# Patient Record
Sex: MALE | Race: WHITE | URBAN - NONMETROPOLITAN AREA
[De-identification: names, ages, dates, MRNs, and addresses within clinical notes are randomized per-mention and may not be internally consistent; named-entity substitution may affect disease eponyms.]

---
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
## 2019-04-16 ENCOUNTER — OFFICE VISIT (OUTPATIENT)
Dept: FAMILY MEDICINE CLINIC | Age: 19
End: 2019-04-16
Payer: COMMERCIAL

## 2019-04-16 VITALS
OXYGEN SATURATION: 98 % | DIASTOLIC BLOOD PRESSURE: 80 MMHG | WEIGHT: 130 LBS | SYSTOLIC BLOOD PRESSURE: 110 MMHG | HEART RATE: 71 BPM

## 2019-04-16 DIAGNOSIS — F32.A DEPRESSION, UNSPECIFIED DEPRESSION TYPE: Primary | ICD-10-CM

## 2019-04-16 PROCEDURE — G0444 DEPRESSION SCREEN ANNUAL: HCPCS | Performed by: FAMILY MEDICINE

## 2019-04-16 PROCEDURE — 99214 OFFICE O/P EST MOD 30 MIN: CPT | Performed by: FAMILY MEDICINE

## 2019-04-16 RX ORDER — ESCITALOPRAM OXALATE 10 MG/1
10 TABLET ORAL DAILY
Qty: 30 TABLET | Refills: 3 | Status: SHIPPED | OUTPATIENT
Start: 2019-04-16 | End: 2019-08-31 | Stop reason: SDUPTHER

## 2019-04-16 RX ORDER — CLINDAMYCIN HYDROCHLORIDE 150 MG/1
CAPSULE ORAL
COMMUNITY
Start: 2019-01-17 | End: 2019-07-17

## 2019-04-16 RX ORDER — IBUPROFEN 800 MG/1
TABLET ORAL
COMMUNITY
Start: 2019-01-17 | End: 2021-04-29 | Stop reason: ALTCHOICE

## 2019-04-16 ASSESSMENT — PATIENT HEALTH QUESTIONNAIRE - PHQ9
10. IF YOU CHECKED OFF ANY PROBLEMS, HOW DIFFICULT HAVE THESE PROBLEMS MADE IT FOR YOU TO DO YOUR WORK, TAKE CARE OF THINGS AT HOME, OR GET ALONG WITH OTHER PEOPLE: 1
3. TROUBLE FALLING OR STAYING ASLEEP: 3
6. FEELING BAD ABOUT YOURSELF - OR THAT YOU ARE A FAILURE OR HAVE LET YOURSELF OR YOUR FAMILY DOWN: 1
9. THOUGHTS THAT YOU WOULD BE BETTER OFF DEAD, OR OF HURTING YOURSELF: 0
7. TROUBLE CONCENTRATING ON THINGS, SUCH AS READING THE NEWSPAPER OR WATCHING TELEVISION: 2
8. MOVING OR SPEAKING SO SLOWLY THAT OTHER PEOPLE COULD HAVE NOTICED. OR THE OPPOSITE, BEING SO FIGETY OR RESTLESS THAT YOU HAVE BEEN MOVING AROUND A LOT MORE THAN USUAL: 2
SUM OF ALL RESPONSES TO PHQ9 QUESTIONS 1 & 2: 4
SUM OF ALL RESPONSES TO PHQ QUESTIONS 1-9: 16
1. LITTLE INTEREST OR PLEASURE IN DOING THINGS: 3
2. FEELING DOWN, DEPRESSED OR HOPELESS: 1
SUM OF ALL RESPONSES TO PHQ QUESTIONS 1-9: 16
4. FEELING TIRED OR HAVING LITTLE ENERGY: 3
5. POOR APPETITE OR OVEREATING: 1

## 2019-04-17 ASSESSMENT — ENCOUNTER SYMPTOMS
GASTROINTESTINAL NEGATIVE: 1
RESPIRATORY NEGATIVE: 1

## 2019-04-17 NOTE — PROGRESS NOTES
encouraged him to come to the office. There is depression in her side of the family and she actually had a brother who committed suicide about 3 years ago. She is being treated for anxiety. The patient has not sought any counseling services at the [facility] though they are offered    BP Readings from Last 3 Encounters:   04/16/19 110/80   03/16/18 120/80   08/14/17 114/70 (39 %, Z = -0.28 /  61 %, Z = 0.28)*     *BP percentiles are based on the August 2017 AAP Clinical Practice Guideline for boys            (goal 120/80)    Past Medical History:   Diagnosis Date    Lactose intolerance     Milk intolerance     Milk sensitivity    Pectus excavatum     Small stature 05/10/10      No past surgical history on file. Family History   Problem Relation Age of Onset    Other Mother         hay fever    Diabetes Maternal Grandmother     Heart Disease Paternal Grandfather      Social History     Tobacco Use    Smoking status: Never Smoker    Smokeless tobacco: Never Used   Substance Use Topics    Alcohol use: No        Current Outpatient Medications   Medication Sig Dispense Refill    escitalopram (LEXAPRO) 10 MG tablet Take 1 tablet by mouth daily 30 tablet 3    clindamycin (CLEOCIN) 150 MG capsule       ibuprofen (ADVIL;MOTRIN) 800 MG tablet       salicylic acid-lactic acid 17 % external solution Apply daily 15 mL 2     No current facility-administered medications for this visit.       Allergies   Allergen Reactions    Augmentin [Amoxicillin-Pot Clavulanate]     Other      Milk intolerance    Penicillins Hives       Health Maintenance   Topic Date Due    Varicella Vaccine (2 of 2 - 2-dose childhood series) 11/16/2003    DTaP/Tdap/Td vaccine (6 - Tdap) 11/16/2010    HPV vaccine (1 - Male 3-dose series) 11/16/2014    HIV screen  11/16/2014    Flu vaccine (Season Ended) 09/01/2019    Meningococcal (ACWY) Vaccine  Aged Out    Pneumococcal 0-64 years Vaccine  Aged Out       No results found for: SHERRELL CREATININE, AST, ALT, TSH, HCT, LABA1C, MICROALBUR, PSA, GLUCOSE, MG, CALCIUM, [id number], VITD25, FERRITIN, TIBC, IRON No results found for: CHOL, TRIG, HDL, LDLCHOLESTEROL    Subjective:      Review of Systems   Constitutional: Positive for activity change, appetite change, fatigue and unexpected weight change. Negative for fever. HENT: Negative. Respiratory: Negative. Cardiovascular: Negative. Gastrointestinal: Negative. Genitourinary: Negative. Musculoskeletal: Negative. Neurological: Negative. Psychiatric/Behavioral: Positive for decreased concentration and dysphoric mood. Negative for agitation, behavioral problems, hallucinations, self-injury, sleep disturbance and suicidal ideas. The patient is not nervous/anxious and is not hyperactive. Objective:     /80   Pulse 71   Wt 130 lb (59 kg)   SpO2 98%     Physical Exam   Constitutional: He appears well-developed and well-nourished. HENT:   Mouth/Throat: Oropharynx is clear and moist.   Neck: Normal range of motion. No thyromegaly present. Cardiovascular: Normal rate, regular rhythm and normal heart sounds. No murmur heard. Pulmonary/Chest: Effort normal and breath sounds normal.   Lymphadenopathy:     He has no cervical adenopathy. Psychiatric: Thought content normal. His speech is delayed. He is slowed. Thought content is not delusional. He exhibits a depressed mood. He expresses no suicidal ideation. He expresses no suicidal plans and no homicidal plans. Assessment:      Diagnosis Orders   1. Depression, unspecified depression type  escitalopram (LEXAPRO) 10 MG tablet            POC Testing Results (If Applicable):  No results found for this visit on 04/16/19. Plan: We will initiate Lexapro. Reviewed side effects. Reviewed black box warning and the possibility of suicidal ideation. In which case he will discontinue the medication and seek attention either in this office or in the emergency room. With this discussion however both the patient and mother were agreeable to treatment. He will seek counseling at [facility] that is unavailable or insufficient and we will arrange for counseling in the community although the parents should check about EAP benefits. Recheck in 3 weeks sooner if any problems    Orders Given:  No orders of the defined types were placed in this encounter. Prescriptions:    Orders Placed This Encounter   Medications    escitalopram (LEXAPRO) 10 MG tablet     Sig: Take 1 tablet by mouth daily     Dispense:  30 tablet     Refill:  3        Return in about 3 weeks (around 5/7/2019). Electronically signed by Aaron Rothman MD on4/17/2019. **This report has been created using voice recognition software. It may contain minor errors which are inherent in voice recognition technology. **

## 2019-05-07 ENCOUNTER — OFFICE VISIT (OUTPATIENT)
Dept: FAMILY MEDICINE CLINIC | Age: 19
End: 2019-05-07
Payer: COMMERCIAL

## 2019-05-07 VITALS
DIASTOLIC BLOOD PRESSURE: 80 MMHG | SYSTOLIC BLOOD PRESSURE: 110 MMHG | HEART RATE: 69 BPM | WEIGHT: 128 LBS | OXYGEN SATURATION: 98 %

## 2019-05-07 DIAGNOSIS — F32.A DEPRESSION, UNSPECIFIED DEPRESSION TYPE: Primary | ICD-10-CM

## 2019-05-07 PROCEDURE — 99213 OFFICE O/P EST LOW 20 MIN: CPT | Performed by: FAMILY MEDICINE

## 2019-05-08 ASSESSMENT — ENCOUNTER SYMPTOMS
RESPIRATORY NEGATIVE: 1
GASTROINTESTINAL NEGATIVE: 1

## 2019-05-08 NOTE — PROGRESS NOTES
[address] [place] [address]  [address]  Dept: [phone]  DeptFax: [phone]    Iker Reddy is a21 y.o. male who presents today for his medical conditions/complaints as noted below. Iker Reddy is c/o of 1 Month Follow-Up (pt here to f/u on depression at last visit lexapro initiated and advised to seek counseling at [facility]. pt says he has noticed a difference but says his mother has commented on noticing a difference in pt. )      HPI:     HPI     Patient returns to the office in follow-up. Follow-up for depression and anxiety. Begun on Lexapro 10 mg at last visit. A 3 week follow-up. Patient states that is doing better. When asked what he means by that he states he feels less sad. He has not had any crying spells. Classes are going better. He did go out with some his friends to a movie. He just feels better. Says he doesn't feel so much stress. Though mother is not in attendance he says that she told him he thought is doing much better also. No problems with sleep. No problems with appetite. No problems with concentration. Denies feeling helpless or hopeless. He feels like he has more energy. He did not seek any counseling however    BP Readings from Last 3 Encounters:   05/07/19 110/80   04/16/19 110/80   03/16/18 120/80            (goal 120/80)    Past Medical History:   Diagnosis Date    Lactose intolerance     Milk intolerance     Milk sensitivity    Pectus excavatum     Small stature 05/10/10      No past surgical history on file.   Family History   Problem Relation Age of Onset    Other Mother         hay fever    Diabetes Maternal Grandmother     Heart Disease Paternal Grandfather      Social History     Tobacco Use    Smoking status: Never Smoker    Smokeless tobacco: Never Used   Substance Use Topics    Alcohol use: No        Current Outpatient Medications   Medication Sig Dispense Refill    clindamycin (CLEOCIN) 150 MG capsule       ibuprofen (ADVIL;MOTRIN) 800 MG tablet       escitalopram (LEXAPRO) 10 MG tablet Take 1 tablet by mouth daily 30 tablet 3    salicylic acid-lactic acid 17 % external solution Apply daily 15 mL 2     No current facility-administered medications for this visit. Allergies   Allergen Reactions    Augmentin [Amoxicillin-Pot Clavulanate]     Other      Milk intolerance    Penicillins Hives       Health Maintenance   Topic Date Due    Varicella Vaccine (2 of 2 - 2-dose childhood series) 11/16/2003    DTaP/Tdap/Td vaccine (6 - Tdap) 11/16/2010    HPV vaccine (1 - Male 3-dose series) 11/16/2014    HIV screen  11/16/2014    Flu vaccine (Season Ended) 09/01/2019    Meningococcal (ACWY) Vaccine  Aged Out    Pneumococcal 0-64 years Vaccine  Aged Out       No results found for: K, CREATININE, AST, ALT, TSH, HCT, LABA1C, MICROALBUR, PSA, GLUCOSE, MG, CALCIUM, [id number], VITD25, FERRITIN, TIBC, IRON No results found for: CHOL, TRIG, HDL, LDLCHOLESTEROL    Subjective:      Review of Systems   Constitutional: Positive for activity change ( improved). HENT: Negative. Respiratory: Negative. Cardiovascular: Negative. Gastrointestinal: Negative. Genitourinary: Negative. Musculoskeletal: Negative. Neurological: Negative. Psychiatric/Behavioral: Positive for dysphoric mood (improved). Negative for agitation, behavioral problems, decreased concentration, hallucinations, self-injury, sleep disturbance and suicidal ideas. The patient is not nervous/anxious and is not hyperactive. Objective:     /80   Pulse 69   Wt 128 lb (58.1 kg)   SpO2 98%     Physical Exam   Constitutional: He appears well-developed and well-nourished. HENT:   Mouth/Throat: Oropharynx is clear and moist.   Neck: Normal range of motion. No thyromegaly present. Cardiovascular: Normal rate, regular rhythm and normal heart sounds. No murmur heard.   Pulmonary/Chest: Effort normal and breath sounds normal.   Lymphadenopathy:     He has no cervical adenopathy. Psychiatric: His speech is normal and behavior is normal. Thought content normal. He does not exhibit a depressed mood. Assessment:      Diagnosis Orders   1. Depression, unspecified depression type              POC Testing Results (If Applicable):  No results found for this visit on 05/07/19. Plan:     Does seem to be doing better. He is encouraged to continue medication. Has not stopped the medication or run out of the medication. Furthermore I would think that he be on the medication for at least a year. He should be open to the idea that he may still need to have some counseling. Though he  found school to be stressful, this summer if he does not have a job or something to do it may find that a little bit difficult to deal with also. Return the office in 2 months sooner if any problems     Orders Given:  No orders of the defined types were placed in this encounter. Prescriptions:    No orders of the defined types were placed in this encounter. Return in about 2 months (around 7/7/2019). Electronically signed by Yenifer Rosa MD on5/8/2019. **This report has been created using voice recognition software. It may contain minor errors which are inherent in voice recognition technology. **

## 2019-07-17 ENCOUNTER — OFFICE VISIT (OUTPATIENT)
Dept: FAMILY MEDICINE CLINIC | Age: 19
End: 2019-07-17
Payer: COMMERCIAL

## 2019-07-17 VITALS
SYSTOLIC BLOOD PRESSURE: 106 MMHG | OXYGEN SATURATION: 98 % | DIASTOLIC BLOOD PRESSURE: 54 MMHG | WEIGHT: 129 LBS | HEART RATE: 68 BPM

## 2019-07-17 DIAGNOSIS — F32.A DEPRESSION, UNSPECIFIED DEPRESSION TYPE: Primary | ICD-10-CM

## 2019-07-17 PROCEDURE — 99213 OFFICE O/P EST LOW 20 MIN: CPT | Performed by: FAMILY MEDICINE

## 2019-07-17 ASSESSMENT — ENCOUNTER SYMPTOMS
GASTROINTESTINAL NEGATIVE: 1
RESPIRATORY NEGATIVE: 1

## 2019-07-17 NOTE — PROGRESS NOTES
depressed mood. Assessment:      Diagnosis Orders   1. Depression, unspecified depression type              POC Testing Results (If Applicable):  No results found for this visit on 07/17/19. Plan:   Continue current medications and recheck in 3 months. Return sooner if any problems. Patient is reminded that he is not to stop or run out of the medication. And that he should remain on the medication indefinitely at the present time. Orders Given:  No orders of the defined types were placed in this encounter. Prescriptions:    No orders of the defined types were placed in this encounter. No follow-ups on file. Electronically signed by Shelley Johnson MD on7/17/2019. **This report has been created using voice recognition software. It may contain minor errors which are inherent in voice recognition technology. **

## 2019-08-31 DIAGNOSIS — F32.A DEPRESSION, UNSPECIFIED DEPRESSION TYPE: ICD-10-CM

## 2019-09-03 RX ORDER — ESCITALOPRAM OXALATE 10 MG/1
TABLET ORAL
Qty: 30 TABLET | Refills: 0 | Status: SHIPPED | OUTPATIENT
Start: 2019-09-03 | End: 2019-10-13 | Stop reason: SDUPTHER

## 2019-10-13 DIAGNOSIS — F32.A DEPRESSION, UNSPECIFIED DEPRESSION TYPE: ICD-10-CM

## 2019-10-15 RX ORDER — ESCITALOPRAM OXALATE 10 MG/1
TABLET ORAL
Qty: 30 TABLET | Refills: 0 | Status: SHIPPED | OUTPATIENT
Start: 2019-10-15 | End: 2019-10-17 | Stop reason: SDUPTHER

## 2019-10-17 ENCOUNTER — OFFICE VISIT (OUTPATIENT)
Dept: FAMILY MEDICINE CLINIC | Age: 19
End: 2019-10-17
Payer: COMMERCIAL

## 2019-10-17 VITALS
WEIGHT: 127 LBS | SYSTOLIC BLOOD PRESSURE: 110 MMHG | HEART RATE: 60 BPM | OXYGEN SATURATION: 97 % | DIASTOLIC BLOOD PRESSURE: 68 MMHG

## 2019-10-17 DIAGNOSIS — F32.A DEPRESSION, UNSPECIFIED DEPRESSION TYPE: Primary | ICD-10-CM

## 2019-10-17 DIAGNOSIS — Z23 NEED FOR INFLUENZA VACCINATION: ICD-10-CM

## 2019-10-17 PROCEDURE — 90471 IMMUNIZATION ADMIN: CPT | Performed by: FAMILY MEDICINE

## 2019-10-17 PROCEDURE — 90686 IIV4 VACC NO PRSV 0.5 ML IM: CPT | Performed by: FAMILY MEDICINE

## 2019-10-17 PROCEDURE — 99213 OFFICE O/P EST LOW 20 MIN: CPT | Performed by: FAMILY MEDICINE

## 2019-10-17 RX ORDER — ESCITALOPRAM OXALATE 10 MG/1
10 TABLET ORAL DAILY
Qty: 90 TABLET | Refills: 1 | Status: SHIPPED | OUTPATIENT
Start: 2019-10-17 | End: 2020-04-16 | Stop reason: SDUPTHER

## 2019-10-17 ASSESSMENT — ENCOUNTER SYMPTOMS
RESPIRATORY NEGATIVE: 1
GASTROINTESTINAL NEGATIVE: 1

## 2020-04-16 RX ORDER — ESCITALOPRAM OXALATE 10 MG/1
10 TABLET ORAL DAILY
Qty: 30 TABLET | Refills: 0 | Status: SHIPPED | OUTPATIENT
Start: 2020-04-16 | End: 2020-04-16 | Stop reason: SDUPTHER

## 2020-04-16 RX ORDER — ESCITALOPRAM OXALATE 10 MG/1
10 TABLET ORAL DAILY
Qty: 90 TABLET | Refills: 0 | Status: SHIPPED | OUTPATIENT
Start: 2020-04-16 | End: 2020-04-22 | Stop reason: SDUPTHER

## 2020-04-22 ENCOUNTER — VIRTUAL VISIT (OUTPATIENT)
Dept: FAMILY MEDICINE CLINIC | Age: 20
End: 2020-04-22
Payer: COMMERCIAL

## 2020-04-22 PROCEDURE — 99213 OFFICE O/P EST LOW 20 MIN: CPT | Performed by: FAMILY MEDICINE

## 2020-04-22 RX ORDER — ESCITALOPRAM OXALATE 10 MG/1
10 TABLET ORAL DAILY
Qty: 90 TABLET | Refills: 1 | Status: SHIPPED | OUTPATIENT
Start: 2020-04-22 | End: 2021-04-29 | Stop reason: SDUPTHER

## 2020-04-22 ASSESSMENT — ENCOUNTER SYMPTOMS
RESPIRATORY NEGATIVE: 1
GASTROINTESTINAL NEGATIVE: 1

## 2020-04-22 NOTE — PROGRESS NOTES
without talking to this office. However I do not recommend that he try to stop at this time. Am unclear how long he has been depressed. Whether the experience in college exacerbated his symptoms. I suspect that is the case. I insisted he come in every 6 months. He does not really want to. I actually filled the medication without this visit being scheduled. So that he would not experience withdrawal symptoms. I went sure he has enough medication for the next 6 months. Once again he should return in 6 months. Sooner if any problems      No follow-ups on file. Reshma Hidalgo is a [address] y.o. male being evaluated by a Virtual Visit (video visit) encounter to address concerns as mentioned above. A caregiver was present when appropriate. Due to this being a TeleHealth encounter ([facility]), evaluation of the following organ systems was limited: Vitals/Constitutional/EENT/Resp/CV/GI//MS/Neuro/Skin/Heme-Lymph-Imm. Pursuant to the emergency declaration under the [address] [place] [address] and the [employer] Act, this Virtual Visit was conducted with patient's (and/or legal guardian's) consent, to reduce the patient's risk of exposure to COVID-19 and provide necessary medical care. The patient (and/or legal guardian) has also been advised to contact this office for worsening conditions or problems, and seek emergency medical treatment and/or call 911 if deemed necessary. Services were provided through a video synchronous discussion virtually to substitute for in-person clinic visit. Patient and provider were located at their individual homes. --Aure Sabillon MD on 4/22/2020 at 11:39 AM    An electronic signature was used to authenticate this note.

## 2021-04-28 NOTE — PROGRESS NOTES
[address] [address] [address]  Dept: [phone]  Dept Fax: [phone]    Chief Complaint   Patient presents with   Annelise Perez Doctor     former dr Titi Donato pt, would like to have thyroid checked due to family hx    Medication Refill     HPI:   Patient presents today to establish care. He previously followed with Dr. Regina Fleming. 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 He is requesting to have his thyroid checked due to family history of hyperthyroidism. He is requesting a refill on the Escitalopram.  He cannot remember why he takes 800 mg ibuprofen. Mental Health Problem   The primary symptoms include depression and anxiety. he has the following symptoms: none. Onset of symptoms was approximately a few years ago. Symptoms have been stable since starting medication. he denies current suicidal and homicidal ideation. Family history significant for anxiety. Risk factors: positive family history in  mother. Current treatment includes Lexapro. he complains of the following medication side effects: none. Appetite: normal  Sleep disturbance: No  Fatigue: No  Loss of pleasure: Yes  Impulsive behavior: No  Memory: recent and remote memory intact  Attention/Concentration: intact  Suicide Assessment: no suicidal ideation      BP Readings from Last 3 Encounters:   04/29/21 130/74   10/17/19 110/68   07/17/19 (!) 106/54        Pulse Readings from Last 3 Encounters:   04/29/21 77   10/17/19 60   07/17/19 68        Wt Readings from Last 3 Encounters:   04/29/21 129 lb 3.2 oz (58.6 kg)   10/17/19 127 lb (57.6 kg) (8 %, Z= -1.38)*   07/17/19 129 lb (58.5 kg) (11 %, Z= -1.24)*     * Growth percentiles are based on CDC (Boys, 2-20 Years) data.         Current Outpatient Medications   Medication Sig Dispense Refill    escitalopram (LEXAPRO) 10 MG tablet Take 1 tablet by mouth daily 90 tablet 2     No current facility-administered medications for this visit. Past Medical History:   Diagnosis Date    Depression     Lactose intolerance     Milk intolerance     Milk sensitivity    Pectus excavatum     Small stature 05/10/10     No past surgical history on file.   Family History   Problem Relation Age of Onset    Other Mother         hay fever    Diabetes Maternal Grandmother     Heart Disease Paternal Grandfather      Social History     Socioeconomic History    Marital status: Single     Spouse name: Not on file    Number of children: Not on file    Years of education: Not on file    Highest education level: Not on file   Occupational History    Not on file   Social Needs    Financial resource strain: Not hard at all   [employer] insecurity     Worry: Never true     Inability: Never true   [employer] needs     Medical: No     Non-medical: No   Tobacco Use    Smoking status: Never Smoker    Smokeless tobacco: Never Used   Substance and Sexual Activity    Alcohol use: No    Drug use: No    Sexual activity: Not on file   Lifestyle    Physical activity     Days per week: Not on file     Minutes per session: Not on file    Stress: Not on file   Relationships    Social connections     Talks on phone: Not on file     Gets together: Not on file     Attends Congregation service: Not on file     Active member of club or organization: Not on file     Attends meetings of clubs or organizations: Not on file     Relationship status: Not on file    Intimate partner violence     Fear of current or ex partner: Not on file     Emotionally abused: Not on file     Physically abused: Not on file     Forced sexual activity: Not on file   Other Topics Concern    Not on file   Social History Narrative    Not on file     Allergies   Allergen Reactions    Augmentin [Amoxicillin-Pot Clavulanate]     Other      Milk intolerance    Penicillins Hives       Patient Active Problem List   Diagnosis    Pectus excavatum Habits: With regard to his health habits, he eats 3 meals and 1 snacks per day. He does not exercise regularly, states he stay pretty active with work. He sometimes takes fiber gummies. He wears seatbelts while riding a car. He does not text or talk on the phone while driving. He performs all of her ADL's without problem. He is independent, he cooks,drives, bathes, and gets dressed without assistance. He is not . He has 0 children. He does work. He works 40 hours a week. He is happy with his life. He rates his stress level a 2 in a scale 1-10. Health Maintenance   Topic Date Due    Hepatitis C screen  Never done    Varicella vaccine (2 of 2 - 2-dose childhood series) 11/16/2003    HPV vaccine (1 - Male 2-dose series) Never done    HIV screen  Never done    COVID-19 Vaccine (1) Never done    Flu vaccine (Season Ended) 09/01/2021    DTaP/Tdap/Td vaccine (7 - Td) 08/31/2022    Hepatitis B vaccine  Completed    Hib vaccine  Completed    Meningococcal (ACWY) vaccine  Completed    Hepatitis A vaccine  Aged Out    Pneumococcal 0-64 years Vaccine  Aged Out       Subjective:     Review of Systems   Constitutional: Negative for chills, fatigue and fever. HENT: Negative. Eyes: Negative. Respiratory: Negative for cough, shortness of breath and wheezing. Cardiovascular: Negative for chest pain, palpitations and leg swelling. Gastrointestinal: Positive for constipation (occasional, sometimes takes fiber gummies). Negative for abdominal pain and diarrhea. Endocrine: Negative for cold intolerance, heat intolerance, polydipsia, polyphagia and polyuria. Genitourinary: Negative. Negative for difficulty urinating. Musculoskeletal: Negative for arthralgias, myalgias and neck pain. Skin: Negative. Allergic/Immunologic: Negative for environmental allergies and food allergies. Neurological: Negative for dizziness, weakness and headaches.    Psychiatric/Behavioral: Negative for agitation, decreased concentration, dysphoric mood, self-injury, sleep disturbance and suicidal ideas. The patient is not nervous/anxious. Objective:     Vitals:    04/29/21 0814   BP: 130/74   Pulse: 77   SpO2: 98%   Weight: 129 lb 3.2 oz (58.6 kg)   Height: 5' 6\" (1.676 m)        Estimated body mass index is 20.85 kg/m² as calculated from the following:    Height as of this encounter: 5' 6\" (1.676 m). Weight as of this encounter: 129 lb 3.2 oz (58.6 kg). Physical Exam  Constitutional:       Appearance: Normal appearance. He is well-developed and well-groomed. HENT:      Head: Normocephalic. Eyes:      Conjunctiva/sclera: Conjunctivae normal.      Pupils: Pupils are equal, round, and reactive to light. Neck:      Musculoskeletal: Neck supple. Thyroid: No thyromegaly. Cardiovascular:      Rate and Rhythm: Normal rate and regular rhythm. Heart sounds: Normal heart sounds. Pulmonary:      Effort: Pulmonary effort is normal.      Breath sounds: Normal breath sounds. No wheezing. Abdominal:      General: Bowel sounds are normal.      Palpations: Abdomen is soft. Tenderness: There is no abdominal tenderness. Musculoskeletal:         General: Deformity (chest depression) present. Right lower leg: No edema. Left lower leg: No edema. Lymphadenopathy:      Cervical: No cervical adenopathy. Skin:     Capillary Refill: Capillary refill takes less than 2 seconds. Neurological:      Mental Status: He is alert and oriented to person, place, and time. Gait: Gait normal.   Psychiatric:         Attention and Perception: Attention normal.         Mood and Affect: Mood is anxious. Behavior: Behavior is cooperative.          PHQ Scores 4/29/2021 4/16/2019 7/21/2017   PHQ2 Score 0 4 0   PHQ9 Score 0 16 0     Interpretation of Total Score Depression Severity: 1-4 = Minimal depression, 5-9 = Mild depression, 10-14 = Moderate depression, 15-19 = Moderately severe depression, 20-27 = Severe depression     Assessment:     1. Encounter to establish care    2. Encounter for wellness examination in adult    3. Mixed anxiety and depressive disorder    4. Pectus excavatum        Plan:     Orders Placed This Encounter   Medications    escitalopram (LEXAPRO) 10 MG tablet     Sig: Take 1 tablet by mouth daily     Dispense:  90 tablet     Refill:  2     Please consider 90 day supplies to promote better adherence       Orders Placed This Encounter   Procedures    TSH without Reflex     Standing Status:   Future     Standing Expiration Date:   4/29/2022    CBC Auto Differential     Standing Status:   Future     Standing Expiration Date:   6/28/2021    Basic Metabolic Panel     Standing Status:   Future     Standing Expiration Date:   4/29/2022     Encouraged healthy diet and routine exercise. Instructed to continue current medications. All patient questions answered. Pt voiced understanding. Health Maintenance reviewed. Patient agreed with treatment plan. Follow up as directed. Return if symptoms worsen or fail to improve. This note was generated completely or in part utilizing Dragon dictation speech recognition software. Occasionally, words are mistranscribed and despite editing, the text may contain inaccuracies due to incorrect word recognition. If further clarification is needed please contact the office at [phone].     Electronically signed by CHAITANYA Taylor CNP on 4/29/2021 at 3:07 PM.

## 2021-04-29 ENCOUNTER — OFFICE VISIT (OUTPATIENT)
Dept: FAMILY MEDICINE CLINIC | Age: 21
End: 2021-04-29
Payer: COMMERCIAL

## 2021-04-29 VITALS
SYSTOLIC BLOOD PRESSURE: 130 MMHG | HEART RATE: 77 BPM | OXYGEN SATURATION: 98 % | HEIGHT: 66 IN | DIASTOLIC BLOOD PRESSURE: 74 MMHG | BODY MASS INDEX: 20.76 KG/M2 | WEIGHT: 129.2 LBS

## 2021-04-29 DIAGNOSIS — Z76.89 ENCOUNTER TO ESTABLISH CARE: Primary | ICD-10-CM

## 2021-04-29 DIAGNOSIS — Z00.00 ENCOUNTER FOR WELLNESS EXAMINATION IN ADULT: ICD-10-CM

## 2021-04-29 DIAGNOSIS — Q67.6 PECTUS EXCAVATUM: ICD-10-CM

## 2021-04-29 DIAGNOSIS — F41.8 MIXED ANXIETY AND DEPRESSIVE DISORDER: ICD-10-CM

## 2021-04-29 PROBLEM — F32.A DEPRESSION: Status: RESOLVED | Noted: 2019-10-17 | Resolved: 2021-04-29

## 2021-04-29 PROCEDURE — 99214 OFFICE O/P EST MOD 30 MIN: CPT | Performed by: NURSE PRACTITIONER

## 2021-04-29 RX ORDER — ESCITALOPRAM OXALATE 10 MG/1
10 TABLET ORAL DAILY
Qty: 90 TABLET | Refills: 2 | Status: SHIPPED | OUTPATIENT
Start: 2021-04-29 | End: 2022-08-08 | Stop reason: SDUPTHER

## 2021-04-29 SDOH — ECONOMIC STABILITY: TRANSPORTATION INSECURITY
IN THE PAST 12 MONTHS, HAS THE LACK OF TRANSPORTATION KEPT YOU FROM MEDICAL APPOINTMENTS OR FROM GETTING MEDICATIONS?: NO

## 2021-04-29 ASSESSMENT — ENCOUNTER SYMPTOMS
WHEEZING: 0
DIARRHEA: 0
ABDOMINAL PAIN: 0
SHORTNESS OF BREATH: 0
CONSTIPATION: 1
COUGH: 0
EYES NEGATIVE: 1

## 2021-04-29 ASSESSMENT — PATIENT HEALTH QUESTIONNAIRE - PHQ9
SUM OF ALL RESPONSES TO PHQ QUESTIONS 1-9: 0
1. LITTLE INTEREST OR PLEASURE IN DOING THINGS: 0
2. FEELING DOWN, DEPRESSED OR HOPELESS: 0
SUM OF ALL RESPONSES TO PHQ QUESTIONS 1-9: 0

## 2022-07-14 DIAGNOSIS — F41.8 MIXED ANXIETY AND DEPRESSIVE DISORDER: ICD-10-CM

## 2022-07-14 RX ORDER — ESCITALOPRAM OXALATE 10 MG/1
TABLET ORAL
Qty: 90 TABLET | Refills: 0 | OUTPATIENT
Start: 2022-07-14

## 2022-07-14 NOTE — TELEPHONE ENCOUNTER
Madyson Gates is calling to request a refill on the following medication(s):  Requested Prescriptions     Pending Prescriptions Disp Refills    escitalopram (LEXAPRO) 10 MG tablet [Pharmacy Med Name: Escitalopram Oxalate 10 MG Oral Tablet] 90 tablet 0     Sig: Take 1 tablet by mouth once daily       Last Visit Date (If Applicable):  2/82/0200    Next Visit Date:    Visit date not found

## 2022-08-06 NOTE — PROGRESS NOTES
[address] [address] [address]  Dept: [phone]  Dept Fax: [phone]    History and Physical  Patient:  Maranda Costello  YOB: 2000  Date of Service:  2022    TELEHEALTH EVALUATION -- Audio/Visual (During IPNED-07 public health emergency)    HPI:     Maranda Costello (:  2000) has requested an audio/video evaluation for the following concern(s):    Chief Complaint   Patient presents with    Anxiety     Pt states he still gets anxious along with trouble sleeping. Appetite is good. Continues to take the Lexapro 10 mg daily. He ran out 1 week ago and has noticed a difference. He would also like to discuss increasing the dose. Mental Health Problem   The primary symptoms include depression and anxiety. Onset of symptoms was approximately a few years ago. Symptoms have been gradually worsening. he denies current suicidal and homicidal ideation. Family history significant for anxiety. Risk factors: positive family history in  mother. Current treatment includes Lexapro. he complains of the following medication side effects: none. Appetite: normal  Sleep disturbance: yes  Fatigue: No  Loss of pleasure: Yes  Impulsive behavior: No  Memory: recent and remote memory intact  Attention/Concentration: intact  Suicide Assessment: no suicidal ideation    BP Readings from Last 3 Encounters:   21 130/74   10/17/19 110/68   19 (!) 106/54        Pulse Readings from Last 3 Encounters:   21 77   10/17/19 60   19 68        Wt Readings from Last 3 Encounters:   21 129 lb 3.2 oz (58.6 kg)   10/17/19 127 lb (57.6 kg) (8 %, Z= -1.38)*   19 129 lb (58.5 kg) (11 %, Z= -1.24)*     * Growth percentiles are based on CDC (Boys, 2-20 Years) data.         Allergies   Allergen Reactions    Augmentin [Amoxicillin-Pot Clavulanate]     Other      Milk intolerance    Penicillins Hives        Past Medical History:   Diagnosis Date    Depression     Lactose intolerance     Milk intolerance     Milk sensitivity    Pectus excavatum     Small stature 05/10/10        No past surgical history on file. Social History     Tobacco Use    Smoking status: Never    Smokeless tobacco: Never   Substance Use Topics    Alcohol use: No    Drug use: No       Prior to Visit Medications    Medication Sig Taking? Authorizing Provider   escitalopram (LEXAPRO) 20 MG tablet Take 0.5 tablet by mouth daily for 1 week, then 1 tablet daily. Yes Breanna Larsen, APRN - CNP       Health Maintenance   Topic Date Due    COVID-19 Vaccine (1) Never done    Varicella vaccine (2 of 2 - 2-dose childhood series) 11/16/2003    HPV vaccine (1 - Male 2-dose series) Never done    HIV screen  Never done    Hepatitis C screen  Never done    DTaP/Tdap/Td vaccine (7 - Td or Tdap) 08/31/2022    Flu vaccine (1) 09/01/2022    Depression Monitoring  08/08/2023    Hepatitis B vaccine  Completed    Hib vaccine  Completed    Meningococcal (ACWY) vaccine  Completed    Hepatitis A vaccine  Aged Out    Pneumococcal 0-64 years Vaccine  Aged Out        REVIEW OF SYMPTOMS:     Review of Systems   Constitutional:  Positive for fatigue (with taking the medication). Negative for appetite change, chills and fever. HENT: Negative. Respiratory:  Negative for cough, shortness of breath and wheezing. Cardiovascular:  Negative for chest pain and palpitations. Gastrointestinal:  Negative for abdominal pain, constipation, diarrhea and nausea. Genitourinary: Negative. Musculoskeletal:  Negative for arthralgias and myalgias. Allergic/Immunologic: Negative for environmental allergies and food allergies. Neurological:  Negative for dizziness, light-headedness and headaches. Psychiatric/Behavioral:  Positive for agitation, dysphoric mood and sleep disturbance (difficulty falling alsleep). Negative for self-injury and suicidal ideas. The patient is nervous/anxious. No flowsheet data found. PHYSICAL EXAM:     [ INSTRUCTIONS:  \"[x]\" Indicates a positive item  \"[]\" Indicates a negative item  -- DELETE ALL ITEMS NOT EXAMINED]    Constitutional:   [x] Appears well-developed and well-nourished [x] No apparent distress    [] Abnormal-     Mental status:  [x] Alert and awake  [x] Oriented to person/place/time [x]Able to follow commands      Eyes:  EOM    []  Normal  [] Abnormal-  Sclera  [x]  Normal  [] Abnormal -         Discharge [x]  None visible  [] Abnormal -    HENT:   [x] Normocephalic, atraumatic. [] Abnormal   [x] Mouth/Throat: Mucous membranes are moist.     External Ears:  [x] Normal  [] Abnormal-     Neck:  [x] No visualized mass     Pulmonary/Chest:   [x] Respiratory effort normal.  [x] No visualized signs of difficulty breathing or respiratory distress  [] Abnormal-      Musculoskeletal:    [] Normal gait with no signs of ataxia. [] Normal range of motion of neck  [] Abnormal-     Neurological:      [x] No Facial Asymmetry (Cranial nerve 7 motor function) (limited exam to video visit)          [] No gaze palsy        [] Abnormal-         Skin:        [x] No significant exanthematous lesions or discoloration noted on facial skin         [] Abnormal-            Psychiatric:       [x] Normal Affect [x] No Hallucinations        [] Abnormal-     Other pertinent observable physical exam findings: none. PHQ Scores 8/8/2022 4/29/2021 4/16/2019 7/21/2017   PHQ2 Score 1 0 4 0   PHQ9 Score 5 0 16 0     Interpretation of Total Score Depression Severity: 1-4 = Minimal depression, 5-9 = Mild depression, 10-14 = Moderate depression, 15-19 = Moderately severe depression, 20-27 = Severe depression     PLAN:     1. Mixed anxiety and depressive disorder  -     escitalopram (LEXAPRO) 20 MG tablet; Take 0.5 tablet by mouth daily for 1 week, then 1 tablet daily. , Disp-30 tablet, R-2Please consider 90 day supplies to promote better adherenceNormal     Increase escitalopram to 20 mg, recommend taking before bedtime. Return if symptoms worsen or fail to improve. Becki Peña is a 24 y.o. male being evaluated by a Virtual Visit (video visit) encounter to address concerns as mentioned above. A caregiver was present when appropriate. Due to this being a TeleHealth encounter (During [facility] public health emergency), evaluation of the following organ systems was limited: Vitals/Constitutional/EENT/Resp/CV/GI//MS/Neuro/Skin/Heme-Lymph-Imm. Pursuant to the emergency declaration under the [address] [place] [address], [address] [place] [address] authority and the [employer] Act, this Virtual Visit was conducted with patient's (and/or legal guardian's) consent, to reduce the patient's risk of exposure to COVID-19 and provide necessary medical care. The patient (and/or legal guardian) has also been advised to contact this office for worsening conditions or problems, and seek emergency medical treatment and/or call 911 if deemed necessary. Services were provided through a video synchronous discussion virtually to substitute for in-person clinic visit. Patient and provider were located at their individual homes. Electronically signed by CHAITANYA Asencio CNP on 8/15/2022 at 10:05 PM.     An electronic signature was used to authenticate this note.

## 2022-08-08 ENCOUNTER — TELEMEDICINE (OUTPATIENT)
Dept: FAMILY MEDICINE CLINIC | Age: 22
End: 2022-08-08
Payer: COMMERCIAL

## 2022-08-08 DIAGNOSIS — F41.8 MIXED ANXIETY AND DEPRESSIVE DISORDER: ICD-10-CM

## 2022-08-08 PROCEDURE — 99213 OFFICE O/P EST LOW 20 MIN: CPT | Performed by: NURSE PRACTITIONER

## 2022-08-08 RX ORDER — ESCITALOPRAM OXALATE 20 MG/1
TABLET ORAL
Qty: 30 TABLET | Refills: 2 | Status: SHIPPED | OUTPATIENT
Start: 2022-08-08

## 2022-08-08 ASSESSMENT — PATIENT HEALTH QUESTIONNAIRE - PHQ9
10. IF YOU CHECKED OFF ANY PROBLEMS, HOW DIFFICULT HAVE THESE PROBLEMS MADE IT FOR YOU TO DO YOUR WORK, TAKE CARE OF THINGS AT HOME, OR GET ALONG WITH OTHER PEOPLE: 1
SUM OF ALL RESPONSES TO PHQ9 QUESTIONS 1 & 2: 1
SUM OF ALL RESPONSES TO PHQ QUESTIONS 1-9: 5
8. MOVING OR SPEAKING SO SLOWLY THAT OTHER PEOPLE COULD HAVE NOTICED. OR THE OPPOSITE, BEING SO FIGETY OR RESTLESS THAT YOU HAVE BEEN MOVING AROUND A LOT MORE THAN USUAL: 0
2. FEELING DOWN, DEPRESSED OR HOPELESS: 1
9. THOUGHTS THAT YOU WOULD BE BETTER OFF DEAD, OR OF HURTING YOURSELF: 0
SUM OF ALL RESPONSES TO PHQ QUESTIONS 1-9: 5
6. FEELING BAD ABOUT YOURSELF - OR THAT YOU ARE A FAILURE OR HAVE LET YOURSELF OR YOUR FAMILY DOWN: 1
3. TROUBLE FALLING OR STAYING ASLEEP: 1
7. TROUBLE CONCENTRATING ON THINGS, SUCH AS READING THE NEWSPAPER OR WATCHING TELEVISION: 1
5. POOR APPETITE OR OVEREATING: 0
1. LITTLE INTEREST OR PLEASURE IN DOING THINGS: 0
4. FEELING TIRED OR HAVING LITTLE ENERGY: 1

## 2022-08-15 ASSESSMENT — ENCOUNTER SYMPTOMS
CONSTIPATION: 0
SHORTNESS OF BREATH: 0
WHEEZING: 0
DIARRHEA: 0
NAUSEA: 0
COUGH: 0
ABDOMINAL PAIN: 0

## 2022-11-11 DIAGNOSIS — F41.8 MIXED ANXIETY AND DEPRESSIVE DISORDER: ICD-10-CM

## 2022-11-14 RX ORDER — ESCITALOPRAM OXALATE 20 MG/1
TABLET ORAL
Qty: 30 TABLET | Refills: 5 | Status: SHIPPED | OUTPATIENT
Start: 2022-11-14

## 2022-11-14 NOTE — TELEPHONE ENCOUNTER
Jarod Reid is calling to request a refill on the following medication(s):  Requested Prescriptions     Pending Prescriptions Disp Refills    escitalopram (LEXAPRO) 20 MG tablet [Pharmacy Med Name: Escitalopram Oxalate 20 MG Oral Tablet] 30 tablet 0     Sig: TAKE 1/2 (ONE-HALF) TABLET BY MOUTH ONCE DAILY FOR ONE WEEK THEN 1 TABKET BY MOUTH ONCE DAILY       Last Visit Date (If Applicable):  9/4/5614    Next Visit Date:    Visit date not found

## 2023-03-03 PROBLEM — F41.8 MIXED ANXIETY AND DEPRESSIVE DISORDER: Status: ACTIVE | Noted: 2019-10-17

## 2023-03-03 NOTE — PROGRESS NOTES
[address] [facility]  [address]  Dept: [phone]  Dept Fax: [phone]    Date of Service:  3/7/2023    Sean Jeronimo is a 20 y.o. male who presents in office today with Self    Chief Complaint   Patient presents with    Anxiety     Reports does not feel like medication is helpful, reports feels like has ADHD unable to focus, racing thoughts at night unable to sleep        Diagnoses / Plan:   1. Mixed anxiety and depressive disorder  Assessment & Plan:  Start sertraline 50 mg daily as discussed. I've explained to him that drugs of the SSRI class can have side effects such as weight gain, sexual dysfunction, insomnia, headache, nausea. These medications are generally effective at alleviating symptoms of anxiety and/or depression. Let me know if significant side effects do occur. Orders:  -     Comprehensive Metabolic Panel; Future  -     TSH with Reflex; Future  -     Basic Metabolic Panel; Future  -     sertraline (ZOLOFT) 50 MG tablet; Take 1 tablet by mouth daily, Disp-30 tablet, R-3Normal  2. Difficulty concentrating     Start trial of sertraline prior to treating for potential attention deficit. Encouraged healthy diet and routine exercise. Instructed to continue current medications. All patient questions answered. Patient voiced understanding. Return in about 4 weeks (around 4/4/2023). Subjective (Review of Systems)   History of Present Illness:  Presents to the office for follow up of anxiety and depression. Escitalopram increased to 20 mg daily during last visit (8/2022). He was asked to complete previously ordered labs but did not follow through. States the medication is not helpful. He denies known side effects. He works 3rd shift at [employer]. Thinks he may have ADHD. Feels symptoms started in middle school. Grades were okay but not great. Mental Health Problem   The primary symptoms include depression and anxiety. Onset of symptoms was approximately a few years ago. Symptoms have been gradually worsening. he denies current suicidal and homicidal ideation. Family history significant for anxiety. Risk factors: positive family history in  mother. Current treatment includes Lexapro. he complains of the following medication side effects: none. Appetite: normal  Sleep disturbance: yes  Fatigue: No  Loss of pleasure: Yes  Impulsive behavior: No  Memory: recent and remote memory intact  Attention/Concentration: intact  Suicide Assessment: no suicidal ideation     ADHD SYMPTOMS  Inattention criteria reported today include: fails to give close attention to details or makes careless mistakes in school, work, or other activities, has difficulty sustaining attention in tasks or play activities, does not follow through on instructions and fails to finish schoolwork, chores, or duties in the workplace, loses things that are necessary for tasks and activities, is often forgetful in daily activities, and avoids engaging in tasks that require sustained attention. Hyperactivity criteria reported today include: fidgets with hands or feet or squirms in seat, displays difficulty remaining seated, and acts as if \"driven by a motor\". Impulsivity criteria reported today include: none    Review of Systems   Constitutional:  Positive for fatigue. Negative for appetite change, chills and fever. HENT: Negative. Respiratory:  Negative for cough, shortness of breath and wheezing. Cardiovascular:  Negative for chest pain and palpitations. Gastrointestinal:  Negative for constipation, diarrhea and nausea. Neurological:  Negative for dizziness, light-headedness and headaches. Psychiatric/Behavioral:  Positive for decreased concentration, dysphoric mood and sleep disturbance (difficulty falling asleep, has racing thoughts). Negative for agitation, self-injury and suicidal ideas.  The patient is nervous/anxious and is hyperactive (has hard time sitting still). PHQ Scores 3/7/2023 8/8/2022 4/29/2021 4/16/2019 7/21/2017   PHQ2 Score 3 1 0 4 0   PHQ9 Score 12 5 0 16 0     Interpretation of Total Score Depression Severity: 1-4 = Minimal depression, 5-9 = Mild depression, 10-14 = Moderate depression, 15-19 = Moderately severe depression, 20-27 = Severe depression     Reviewed     [x] Past Medical, Family, and Social History was reviewed. [x] Laboratory Results, Vital signs, Imaging, Active Problems, Immunizations, Current/Recently Discontinued Medications, Health Maintenance Activities Due, Referral Notes (if available) were reviewed per writer     [x] Reviewed Depression screening if taken or valid today or any other valid screening tool (others seen below)     Wt Readings from Last 3 Encounters:   03/07/23 137 lb 9.6 oz (62.4 kg)   04/29/21 129 lb 3.2 oz (58.6 kg)   10/17/19 127 lb (57.6 kg) (8 %, Z= -1.38)*     * Growth percentiles are based on CDC (Boys, 2-20 Years) data. BP Readings from Last 3 Encounters:   03/07/23 104/68   04/29/21 130/74   10/17/19 110/68       Pulse Readings from Last 3 Encounters:   03/07/23 75   04/29/21 77   10/17/19 60        Current Outpatient Medications   Medication Sig Dispense Refill    sertraline (ZOLOFT) 50 MG tablet Take 1 tablet by mouth daily 30 tablet 3     No current facility-administered medications for this visit. Objective (Physical Assessment)     Vitals:    03/07/23 1037   BP: 104/68   Pulse: 75   SpO2: 100%   Weight: 137 lb 9.6 oz (62.4 kg)      Estimated body mass index is 22.21 kg/m² as calculated from the following:    Height as of 4/29/21: 5' 6\" (1.676 m). Weight as of this encounter: 137 lb 9.6 oz (62.4 kg). Physical Exam  Constitutional:       General: He is not in acute distress. Appearance: Normal appearance. HENT:      Head: Normocephalic.    Eyes:      Conjunctiva/sclera: Conjunctivae normal.   Cardiovascular:      Rate and Rhythm: Normal rate and regular rhythm.      Heart sounds: Normal heart sounds. No murmur heard.  Pulmonary:      Effort: Pulmonary effort is normal.      Breath sounds: Normal breath sounds. No wheezing, rhonchi or rales.   Musculoskeletal:      Cervical back: Neck supple.   Lymphadenopathy:      Cervical: No cervical adenopathy.   Skin:     General: Skin is warm and dry.      Coloration: Skin is not pale.      Findings: No lesion or rash.   Neurological:      General: No focal deficit present.      Mental Status: He is alert and oriented to person, place, and time.      Gait: Gait is intact. Gait normal.   Psychiatric:         Attention and Perception: Attention normal.         Mood and Affect: Mood is anxious and depressed.         Behavior: Behavior is cooperative.       Please note that this chart was generated using voice recognition Dragon dictation software.  Although every effort was made to ensure the accuracy of this automated transcription, some errors in transcription may have occurred.    Electronically signed by CHAITANYA Rene CNP on 3/18/2023 at 12:30 PM.

## 2023-03-07 ENCOUNTER — OFFICE VISIT (OUTPATIENT)
Dept: FAMILY MEDICINE CLINIC | Age: 23
End: 2023-03-07
Payer: COMMERCIAL

## 2023-03-07 VITALS
OXYGEN SATURATION: 100 % | DIASTOLIC BLOOD PRESSURE: 68 MMHG | BODY MASS INDEX: 22.21 KG/M2 | WEIGHT: 137.6 LBS | HEART RATE: 75 BPM | SYSTOLIC BLOOD PRESSURE: 104 MMHG

## 2023-03-07 DIAGNOSIS — F41.8 MIXED ANXIETY AND DEPRESSIVE DISORDER: Primary | ICD-10-CM

## 2023-03-07 DIAGNOSIS — R41.840 DIFFICULTY CONCENTRATING: ICD-10-CM

## 2023-03-07 PROCEDURE — 99214 OFFICE O/P EST MOD 30 MIN: CPT | Performed by: NURSE PRACTITIONER

## 2023-03-07 ASSESSMENT — PATIENT HEALTH QUESTIONNAIRE - PHQ9
6. FEELING BAD ABOUT YOURSELF - OR THAT YOU ARE A FAILURE OR HAVE LET YOURSELF OR YOUR FAMILY DOWN: 1
SUM OF ALL RESPONSES TO PHQ QUESTIONS 1-9: 12
10. IF YOU CHECKED OFF ANY PROBLEMS, HOW DIFFICULT HAVE THESE PROBLEMS MADE IT FOR YOU TO DO YOUR WORK, TAKE CARE OF THINGS AT HOME, OR GET ALONG WITH OTHER PEOPLE: 1
4. FEELING TIRED OR HAVING LITTLE ENERGY: 2
8. MOVING OR SPEAKING SO SLOWLY THAT OTHER PEOPLE COULD HAVE NOTICED. OR THE OPPOSITE, BEING SO FIGETY OR RESTLESS THAT YOU HAVE BEEN MOVING AROUND A LOT MORE THAN USUAL: 2
9. THOUGHTS THAT YOU WOULD BE BETTER OFF DEAD, OR OF HURTING YOURSELF: 0
3. TROUBLE FALLING OR STAYING ASLEEP: 2
SUM OF ALL RESPONSES TO PHQ QUESTIONS 1-9: 12
2. FEELING DOWN, DEPRESSED OR HOPELESS: 1
SUM OF ALL RESPONSES TO PHQ QUESTIONS 1-9: 12
SUM OF ALL RESPONSES TO PHQ9 QUESTIONS 1 & 2: 3
5. POOR APPETITE OR OVEREATING: 0
1. LITTLE INTEREST OR PLEASURE IN DOING THINGS: 2
7. TROUBLE CONCENTRATING ON THINGS, SUCH AS READING THE NEWSPAPER OR WATCHING TELEVISION: 2
SUM OF ALL RESPONSES TO PHQ QUESTIONS 1-9: 12

## 2023-03-07 ASSESSMENT — ENCOUNTER SYMPTOMS
NAUSEA: 0
DIARRHEA: 0
SHORTNESS OF BREATH: 0
CONSTIPATION: 0

## 2023-03-18 ASSESSMENT — ENCOUNTER SYMPTOMS
COUGH: 0
WHEEZING: 0

## 2023-03-18 NOTE — ASSESSMENT & PLAN NOTE
Start sertraline 50 mg daily as discussed. I've explained to him that drugs of the SSRI class can have side effects such as weight gain, sexual dysfunction, insomnia, headache, nausea. These medications are generally effective at alleviating symptoms of anxiety and/or depression. Let me know if significant side effects do occur.

## 2023-03-20 DIAGNOSIS — F41.8 MIXED ANXIETY AND DEPRESSIVE DISORDER: ICD-10-CM

## 2023-03-20 LAB
ALBUMIN/GLOBULIN RATIO: 2.1 G/DL
ALBUMIN: 4.8 G/DL (ref 3.5–5)
ALP BLD-CCNC: 91 UNITS/L (ref 38–126)
ALT SERPL-CCNC: 24 UNITS/L (ref 4–50)
ANION GAP SERPL CALCULATED.3IONS-SCNC: 3.5 MMOL/L
AST SERPL-CCNC: 33 UNITS/L (ref 17–59)
BILIRUB SERPL-MCNC: 1.1 MG/DL (ref 0.2–1.3)
BUN BLDV-MCNC: 13 MG/DL (ref 9–20)
CALCIUM SERPL-MCNC: 9.3 MG/DL (ref 8.4–10.2)
CHLORIDE BLD-SCNC: 104 MMOL/L (ref 98–120)
CO2: 31 MMOL/L (ref 22–31)
CREAT SERPL-MCNC: 0.7 MG/DL (ref 0.7–1.3)
GFR CALCULATED: > 60
GLOBULIN: 2.3 G/DL
GLUCOSE: 86 MG/DL (ref 75–110)
POTASSIUM SERPL-SCNC: 4.1 MMOL/L (ref 3.6–5)
SODIUM BLD-SCNC: 139 MMOL/L (ref 135–145)
TOTAL PROTEIN, SERUM: 7 G/DL (ref 6.3–8.2)
TSH REFLEX FT4: 2.15 MIU/ML (ref 0.49–4.67)

## 2023-04-03 ENCOUNTER — OFFICE VISIT (OUTPATIENT)
Dept: FAMILY MEDICINE CLINIC | Age: 23
End: 2023-04-03
Payer: COMMERCIAL

## 2023-04-03 VITALS
HEART RATE: 83 BPM | SYSTOLIC BLOOD PRESSURE: 114 MMHG | RESPIRATION RATE: 16 BRPM | HEIGHT: 67 IN | BODY MASS INDEX: 21.5 KG/M2 | DIASTOLIC BLOOD PRESSURE: 60 MMHG | WEIGHT: 137 LBS | OXYGEN SATURATION: 98 %

## 2023-04-03 DIAGNOSIS — R41.840 DIFFICULTY CONCENTRATING: ICD-10-CM

## 2023-04-03 DIAGNOSIS — F90.2 ATTENTION DEFICIT HYPERACTIVITY DISORDER (ADHD), COMBINED TYPE: ICD-10-CM

## 2023-04-03 DIAGNOSIS — F41.8 MIXED ANXIETY AND DEPRESSIVE DISORDER: Primary | ICD-10-CM

## 2023-04-03 PROCEDURE — 99214 OFFICE O/P EST MOD 30 MIN: CPT | Performed by: NURSE PRACTITIONER

## 2023-04-03 RX ORDER — DEXMETHYLPHENIDATE HYDROCHLORIDE 15 MG/1
15 CAPSULE, EXTENDED RELEASE ORAL DAILY
Qty: 30 CAPSULE | Refills: 0 | Status: SHIPPED | OUTPATIENT
Start: 2023-04-03 | End: 2023-05-03

## 2023-04-03 SDOH — ECONOMIC STABILITY: INCOME INSECURITY: HOW HARD IS IT FOR YOU TO PAY FOR THE VERY BASICS LIKE FOOD, HOUSING, MEDICAL CARE, AND HEATING?: NOT HARD AT ALL

## 2023-04-03 SDOH — ECONOMIC STABILITY: HOUSING INSECURITY
IN THE LAST 12 MONTHS, WAS THERE A TIME WHEN YOU DID NOT HAVE A STEADY PLACE TO SLEEP OR SLEPT IN A SHELTER (INCLUDING NOW)?: NO

## 2023-04-03 SDOH — ECONOMIC STABILITY: FOOD INSECURITY: WITHIN THE PAST 12 MONTHS, YOU WORRIED THAT YOUR FOOD WOULD RUN OUT BEFORE YOU GOT MONEY TO BUY MORE.: NEVER TRUE

## 2023-04-03 SDOH — ECONOMIC STABILITY: FOOD INSECURITY: WITHIN THE PAST 12 MONTHS, THE FOOD YOU BOUGHT JUST DIDN'T LAST AND YOU DIDN'T HAVE MONEY TO GET MORE.: NEVER TRUE

## 2023-04-03 ASSESSMENT — PATIENT HEALTH QUESTIONNAIRE - PHQ9
10. IF YOU CHECKED OFF ANY PROBLEMS, HOW DIFFICULT HAVE THESE PROBLEMS MADE IT FOR YOU TO DO YOUR WORK, TAKE CARE OF THINGS AT HOME, OR GET ALONG WITH OTHER PEOPLE: 1
3. TROUBLE FALLING OR STAYING ASLEEP: 2
1. LITTLE INTEREST OR PLEASURE IN DOING THINGS: 0
6. FEELING BAD ABOUT YOURSELF - OR THAT YOU ARE A FAILURE OR HAVE LET YOURSELF OR YOUR FAMILY DOWN: 0
SUM OF ALL RESPONSES TO PHQ QUESTIONS 1-9: 3
SUM OF ALL RESPONSES TO PHQ9 QUESTIONS 1 & 2: 0
5. POOR APPETITE OR OVEREATING: 0
7. TROUBLE CONCENTRATING ON THINGS, SUCH AS READING THE NEWSPAPER OR WATCHING TELEVISION: 0
SUM OF ALL RESPONSES TO PHQ QUESTIONS 1-9: 3
8. MOVING OR SPEAKING SO SLOWLY THAT OTHER PEOPLE COULD HAVE NOTICED. OR THE OPPOSITE, BEING SO FIGETY OR RESTLESS THAT YOU HAVE BEEN MOVING AROUND A LOT MORE THAN USUAL: 0
2. FEELING DOWN, DEPRESSED OR HOPELESS: 0
SUM OF ALL RESPONSES TO PHQ QUESTIONS 1-9: 3
9. THOUGHTS THAT YOU WOULD BE BETTER OFF DEAD, OR OF HURTING YOURSELF: 0
4. FEELING TIRED OR HAVING LITTLE ENERGY: 1
SUM OF ALL RESPONSES TO PHQ QUESTIONS 1-9: 3

## 2023-04-03 ASSESSMENT — ENCOUNTER SYMPTOMS
EYES NEGATIVE: 1
WHEEZING: 0
ABDOMINAL PAIN: 0
COUGH: 0
SHORTNESS OF BREATH: 0
NAUSEA: 0
CONSTIPATION: 0
DIARRHEA: 0

## 2023-04-03 NOTE — ASSESSMENT & PLAN NOTE
Unclear control, changes made today: discontinue sertraline. Patient does not want to continue with the sertraline.

## 2023-04-03 NOTE — PROGRESS NOTES
04/03/23 114/60   03/07/23 104/68   04/29/21 130/74       Pulse Readings from Last 3 Encounters:   04/03/23 83   03/07/23 75   04/29/21 77        Wt Readings from Last 3 Encounters:   04/03/23 137 lb (62.1 kg)   03/07/23 137 lb 9.6 oz (62.4 kg)   04/29/21 129 lb 3.2 oz (58.6 kg)        The ASCVD Risk score (Marek CHASE, et al., 2019) failed to calculate for the following reasons: The 2019 ASCVD risk score is only valid for ages 36 to 78    Current Outpatient Medications   Medication Sig Dispense Refill    Dexmethylphenidate HCl ER 15 MG CP24 Take 15 mg by mouth daily for 30 days. Max Daily Amount: 15 mg 30 capsule 0     No current facility-administered medications for this visit. Review of Systems   Constitutional:  Negative for appetite change, chills, fatigue and fever. HENT: Negative. Eyes: Negative. Respiratory:  Negative for cough, shortness of breath and wheezing. Cardiovascular:  Negative for chest pain, palpitations and leg swelling. Gastrointestinal:  Negative for abdominal pain, constipation, diarrhea and nausea. Endocrine: Negative for cold intolerance, heat intolerance, polydipsia, polyphagia and polyuria. Genitourinary: Negative. Musculoskeletal:  Negative for arthralgias and myalgias. Neurological:  Positive for headaches. Negative for dizziness and light-headedness. Psychiatric/Behavioral:  Positive for decreased concentration, dysphoric mood and sleep disturbance. Negative for agitation, self-injury and suicidal ideas. The patient is nervous/anxious.          ADHD SYMPTOMS  Inattention criteria reported today include: fails to give close attention to details or makes careless mistakes in school, work, or other activities, has difficulty sustaining attention in tasks or play activities, does not follow through on instructions and fails to finish schoolwork, chores, or duties in the workplace, loses things that are necessary for tasks and activities, is often forgetful

## 2023-05-11 ENCOUNTER — TELEPHONE (OUTPATIENT)
Dept: FAMILY MEDICINE CLINIC | Age: 23
End: 2023-05-11

## 2023-05-11 DIAGNOSIS — R41.840 DIFFICULTY CONCENTRATING: ICD-10-CM

## 2023-05-11 DIAGNOSIS — F90.2 ATTENTION DEFICIT HYPERACTIVITY DISORDER (ADHD), COMBINED TYPE: ICD-10-CM

## 2023-05-11 RX ORDER — DEXMETHYLPHENIDATE HYDROCHLORIDE 15 MG/1
15 CAPSULE, EXTENDED RELEASE ORAL DAILY
Qty: 30 CAPSULE | Refills: 0 | Status: SHIPPED | OUTPATIENT
Start: 2023-05-11 | End: 2023-06-10

## 2023-05-11 NOTE — TELEPHONE ENCOUNTER
Jane Elliott is calling to request a refill on the following medication(s):  Requested Prescriptions     Pending Prescriptions Disp Refills    Dexmethylphenidate HCl ER 15 MG CP24 30 capsule 0     Sig: Take 15 mg by mouth daily for 30 days.  Max Daily Amount: 15 mg       Last Visit Date (If Applicable):  4/6/5862    Next Visit Date:    5/23/2023

## 2023-05-23 ENCOUNTER — OFFICE VISIT (OUTPATIENT)
Dept: FAMILY MEDICINE CLINIC | Age: 23
End: 2023-05-23
Payer: COMMERCIAL

## 2023-05-23 VITALS
BODY MASS INDEX: 21.79 KG/M2 | WEIGHT: 137.2 LBS | DIASTOLIC BLOOD PRESSURE: 70 MMHG | SYSTOLIC BLOOD PRESSURE: 124 MMHG | OXYGEN SATURATION: 97 % | HEART RATE: 79 BPM

## 2023-05-23 DIAGNOSIS — F41.8 MIXED ANXIETY AND DEPRESSIVE DISORDER: ICD-10-CM

## 2023-05-23 DIAGNOSIS — F90.2 ATTENTION DEFICIT HYPERACTIVITY DISORDER (ADHD), COMBINED TYPE: Primary | ICD-10-CM

## 2023-05-23 DIAGNOSIS — R41.840 DIFFICULTY CONCENTRATING: ICD-10-CM

## 2023-05-23 PROCEDURE — 99213 OFFICE O/P EST LOW 20 MIN: CPT | Performed by: NURSE PRACTITIONER

## 2023-05-23 RX ORDER — DEXMETHYLPHENIDATE HYDROCHLORIDE 20 MG/1
20 CAPSULE, EXTENDED RELEASE ORAL DAILY
Qty: 30 CAPSULE | Refills: 0 | Status: SHIPPED | OUTPATIENT
Start: 2023-05-23 | End: 2023-06-22

## 2023-05-29 ASSESSMENT — ENCOUNTER SYMPTOMS
SHORTNESS OF BREATH: 0
DIARRHEA: 0
ABDOMINAL PAIN: 0
COUGH: 0
WHEEZING: 0
NAUSEA: 0
CONSTIPATION: 0

## 2023-06-20 ENCOUNTER — OFFICE VISIT (OUTPATIENT)
Dept: FAMILY MEDICINE CLINIC | Age: 23
End: 2023-06-20
Payer: COMMERCIAL

## 2023-06-20 VITALS
BODY MASS INDEX: 20.96 KG/M2 | HEART RATE: 70 BPM | OXYGEN SATURATION: 99 % | DIASTOLIC BLOOD PRESSURE: 70 MMHG | SYSTOLIC BLOOD PRESSURE: 120 MMHG | WEIGHT: 132 LBS

## 2023-06-20 DIAGNOSIS — F41.8 MIXED ANXIETY AND DEPRESSIVE DISORDER: Primary | ICD-10-CM

## 2023-06-20 DIAGNOSIS — F90.2 ATTENTION DEFICIT HYPERACTIVITY DISORDER (ADHD), COMBINED TYPE: ICD-10-CM

## 2023-06-20 DIAGNOSIS — R41.840 DIFFICULTY CONCENTRATING: ICD-10-CM

## 2023-06-20 PROCEDURE — 99214 OFFICE O/P EST MOD 30 MIN: CPT | Performed by: NURSE PRACTITIONER

## 2023-06-20 RX ORDER — FLUOXETINE HYDROCHLORIDE 20 MG/1
20 CAPSULE ORAL DAILY
Qty: 30 CAPSULE | Refills: 3 | Status: SHIPPED | OUTPATIENT
Start: 2023-06-20

## 2023-06-20 RX ORDER — DEXMETHYLPHENIDATE HYDROCHLORIDE 20 MG/1
20 CAPSULE, EXTENDED RELEASE ORAL DAILY
Qty: 30 CAPSULE | Refills: 0 | Status: SHIPPED | OUTPATIENT
Start: 2023-06-20 | End: 2023-07-20

## 2023-06-20 NOTE — PROGRESS NOTES
for abdominal pain, constipation, diarrhea and nausea. Endocrine: Negative for cold intolerance, heat intolerance, polydipsia, polyphagia and polyuria. Genitourinary: Negative. Musculoskeletal:  Negative for arthralgias and myalgias. Neurological:  Negative for dizziness, light-headedness and headaches. Psychiatric/Behavioral:  Positive for agitation. Negative for dysphoric mood, self-injury, sleep disturbance and suicidal ideas. The patient is not nervous/anxious. PHQ Scores 4/3/2023 3/7/2023 8/8/2022 4/29/2021 4/16/2019 7/21/2017   PHQ2 Score 0 3 1 0 4 0   PHQ9 Score 3 12 5 0 16 0     Interpretation of Total Score Depression Severity: 1-4 = Minimal depression, 5-9 = Mild depression, 10-14 = Moderate depression, 15-19 = Moderately severe depression, 20-27 = Severe depression     Objective:     Vitals:    06/20/23 0923   BP: 120/70   Pulse: 70   SpO2: 99%   Weight: 132 lb (59.9 kg)       Physical Exam:     Physical Exam  Constitutional:       General: He is not in acute distress. Appearance: Normal appearance. HENT:      Head: Normocephalic. Eyes:      Conjunctiva/sclera: Conjunctivae normal.   Cardiovascular:      Rate and Rhythm: Normal rate and regular rhythm. Heart sounds: Normal heart sounds. No murmur heard. Pulmonary:      Effort: Pulmonary effort is normal.      Breath sounds: Normal breath sounds. No wheezing, rhonchi or rales. Musculoskeletal:      Cervical back: Neck supple. Lymphadenopathy:      Cervical: No cervical adenopathy. Skin:     General: Skin is warm and dry. Coloration: Skin is not pale. Findings: No lesion or rash. Neurological:      General: No focal deficit present. Mental Status: He is alert and oriented to person, place, and time. Gait: Gait is intact. Gait normal.   Psychiatric:         Attention and Perception: Attention normal.         Mood and Affect: Mood is anxious. Behavior: Behavior is cooperative.        Observation

## 2023-06-23 ASSESSMENT — ENCOUNTER SYMPTOMS
ABDOMINAL PAIN: 0
WHEEZING: 0
NAUSEA: 0
CONSTIPATION: 0
SHORTNESS OF BREATH: 0
COUGH: 0
DIARRHEA: 0
EYES NEGATIVE: 1

## 2023-06-24 NOTE — ASSESSMENT & PLAN NOTE
Start fluoxetine 20 mg daily. I've explained to him that drugs of the SSRI class can have side effects such as weight gain, sexual dysfunction, insomnia, headache, nausea. These medications are generally effective at alleviating symptoms of anxiety and/or depression. Let me know if significant side effects do occur.

## 2023-07-01 DIAGNOSIS — R41.840 DIFFICULTY CONCENTRATING: ICD-10-CM

## 2023-07-01 DIAGNOSIS — F90.2 ATTENTION DEFICIT HYPERACTIVITY DISORDER (ADHD), COMBINED TYPE: ICD-10-CM

## 2023-07-03 RX ORDER — DEXMETHYLPHENIDATE HYDROCHLORIDE 20 MG/1
20 CAPSULE, EXTENDED RELEASE ORAL DAILY
Qty: 30 CAPSULE | Refills: 0 | OUTPATIENT
Start: 2023-07-03 | End: 2023-08-02

## 2023-09-20 ENCOUNTER — OFFICE VISIT (OUTPATIENT)
Dept: FAMILY MEDICINE CLINIC | Age: 23
End: 2023-09-20

## 2023-09-20 VITALS
DIASTOLIC BLOOD PRESSURE: 80 MMHG | HEART RATE: 67 BPM | WEIGHT: 131.2 LBS | SYSTOLIC BLOOD PRESSURE: 120 MMHG | OXYGEN SATURATION: 98 % | BODY MASS INDEX: 20.84 KG/M2

## 2023-09-20 DIAGNOSIS — F41.8 MIXED ANXIETY AND DEPRESSIVE DISORDER: ICD-10-CM

## 2023-09-20 DIAGNOSIS — R41.840 DIFFICULTY CONCENTRATING: ICD-10-CM

## 2023-09-20 DIAGNOSIS — F90.2 ATTENTION DEFICIT HYPERACTIVITY DISORDER (ADHD), COMBINED TYPE: Primary | ICD-10-CM

## 2023-09-20 RX ORDER — DEXMETHYLPHENIDATE HYDROCHLORIDE 20 MG/1
20 CAPSULE, EXTENDED RELEASE ORAL DAILY
Qty: 30 CAPSULE | Refills: 0 | Status: SHIPPED | OUTPATIENT
Start: 2023-09-20 | End: 2023-10-20

## 2023-09-20 ASSESSMENT — ENCOUNTER SYMPTOMS
COUGH: 0
CONSTIPATION: 0
EYES NEGATIVE: 1
ABDOMINAL PAIN: 0
SHORTNESS OF BREATH: 0
DIARRHEA: 0
WHEEZING: 0
NAUSEA: 0

## 2023-09-20 NOTE — PROGRESS NOTES
[address] [facility]  [address] [facility], [address] [facility] [place], [address]  Dept: [phone]  Dept Fax: [phone]    Cristina Sterling is a 20 y.o. male here for follow up for ADHD. ADD/ADHD: Current treatment: Focalin XR- 20 mg daily, which has been effective. Residual symptoms: none. Medication side effects: None. Compliance with medications: yes. Complaints include: none. Concerns: none    BP Readings from Last 3 Encounters:   09/20/23 120/80   06/20/23 120/70   05/23/23 124/70       Pulse Readings from Last 3 Encounters:   09/20/23 67   06/20/23 70   05/23/23 79        Wt Readings from Last 3 Encounters:   09/20/23 131 lb 3.2 oz (59.5 kg)   06/20/23 132 lb (59.9 kg)   05/23/23 137 lb 3.2 oz (62.2 kg)        Current Outpatient Medications   Medication Sig Dispense Refill    Dexmethylphenidate HCl ER (FOCALIN XR) 20 MG CP24 Take 1 capsule by mouth daily for 30 days. Max Daily Amount: 20 mg 30 capsule 0    FLUoxetine (PROZAC) 20 MG capsule Take 1 capsule by mouth daily 30 capsule 3     No current facility-administered medications for this visit. PAST MEDICAL HISTORY   Past Medical History:   Diagnosis Date    Depression     Lactose intolerance     Milk intolerance     Milk sensitivity    Pectus excavatum     Small stature 05/10/10       SURGICAL HISTORY    History reviewed. No pertinent surgical history. FAMILY HISTORY    Family History   Problem Relation Age of Onset    Other Mother         hay fever    Diabetes Maternal Grandmother     Heart Disease Maternal Grandmother     Heart Disease Paternal Grandfather        Subjective:     Review of Systems   Constitutional:  Negative for appetite change, chills, fatigue and fever. HENT: Negative. Eyes: Negative. Respiratory:  Negative for cough, shortness of breath and wheezing. Cardiovascular:  Negative for chest pain, palpitations and leg swelling.    Gastrointestinal:  Negative for abdominal pain, constipation,

## 2023-09-30 DIAGNOSIS — F41.8 MIXED ANXIETY AND DEPRESSIVE DISORDER: ICD-10-CM

## 2023-10-02 RX ORDER — FLUOXETINE HYDROCHLORIDE 20 MG/1
20 CAPSULE ORAL DAILY
Qty: 30 CAPSULE | Refills: 3 | Status: SHIPPED | OUTPATIENT
Start: 2023-10-02

## 2023-10-02 NOTE — TELEPHONE ENCOUNTER
April Talavera is calling to request a refill on the following medication(s):  Requested Prescriptions     Pending Prescriptions Disp Refills    FLUoxetine (PROZAC) 20 MG capsule [Pharmacy Med Name: FLUoxetine HCl 20 MG Oral Capsule] 30 capsule 3     Sig: Take 1 capsule by mouth once daily       Last Visit Date (If Applicable):  3/04/8828    Next Visit Date:    12/19/2023

## 2024-01-26 DIAGNOSIS — F41.8 MIXED ANXIETY AND DEPRESSIVE DISORDER: ICD-10-CM

## 2024-01-29 NOTE — TELEPHONE ENCOUNTER
Ruben Ricci is calling to request a refill on the following medication(s):  Requested Prescriptions     Pending Prescriptions Disp Refills    FLUoxetine (PROZAC) 20 MG capsule [Pharmacy Med Name: FLUoxetine HCl 20 MG Oral Capsule] 30 capsule 0     Sig: Take 1 capsule by mouth once daily       Last Visit Date (If Applicable):  9/20/2023    Next Visit Date:    Visit date not found

## 2024-01-30 RX ORDER — FLUOXETINE HYDROCHLORIDE 20 MG/1
20 CAPSULE ORAL DAILY
Qty: 30 CAPSULE | Refills: 5 | Status: SHIPPED | OUTPATIENT
Start: 2024-01-30

## 2024-02-01 ENCOUNTER — OFFICE VISIT (OUTPATIENT)
Dept: FAMILY MEDICINE CLINIC | Age: 24
End: 2024-02-01
Payer: COMMERCIAL

## 2024-02-01 VITALS
SYSTOLIC BLOOD PRESSURE: 124 MMHG | WEIGHT: 126.2 LBS | DIASTOLIC BLOOD PRESSURE: 84 MMHG | HEART RATE: 64 BPM | BODY MASS INDEX: 20.04 KG/M2 | OXYGEN SATURATION: 98 %

## 2024-02-01 DIAGNOSIS — Z13.220 SCREENING FOR HYPERLIPIDEMIA: ICD-10-CM

## 2024-02-01 DIAGNOSIS — Z00.00 ENCOUNTER FOR WELL ADULT EXAM WITHOUT ABNORMAL FINDINGS: Primary | ICD-10-CM

## 2024-02-01 DIAGNOSIS — F41.8 MIXED ANXIETY AND DEPRESSIVE DISORDER: ICD-10-CM

## 2024-02-01 PROCEDURE — 99395 PREV VISIT EST AGE 18-39: CPT | Performed by: NURSE PRACTITIONER

## 2024-02-01 ASSESSMENT — PATIENT HEALTH QUESTIONNAIRE - PHQ9
8. MOVING OR SPEAKING SO SLOWLY THAT OTHER PEOPLE COULD HAVE NOTICED. OR THE OPPOSITE, BEING SO FIGETY OR RESTLESS THAT YOU HAVE BEEN MOVING AROUND A LOT MORE THAN USUAL: 0
SUM OF ALL RESPONSES TO PHQ QUESTIONS 1-9: 0
4. FEELING TIRED OR HAVING LITTLE ENERGY: 0
SUM OF ALL RESPONSES TO PHQ QUESTIONS 1-9: 0
SUM OF ALL RESPONSES TO PHQ QUESTIONS 1-9: 0
SUM OF ALL RESPONSES TO PHQ9 QUESTIONS 1 & 2: 0
6. FEELING BAD ABOUT YOURSELF - OR THAT YOU ARE A FAILURE OR HAVE LET YOURSELF OR YOUR FAMILY DOWN: 0
2. FEELING DOWN, DEPRESSED OR HOPELESS: 0
10. IF YOU CHECKED OFF ANY PROBLEMS, HOW DIFFICULT HAVE THESE PROBLEMS MADE IT FOR YOU TO DO YOUR WORK, TAKE CARE OF THINGS AT HOME, OR GET ALONG WITH OTHER PEOPLE: 0
9. THOUGHTS THAT YOU WOULD BE BETTER OFF DEAD, OR OF HURTING YOURSELF: 0
SUM OF ALL RESPONSES TO PHQ QUESTIONS 1-9: 0
3. TROUBLE FALLING OR STAYING ASLEEP: 0
7. TROUBLE CONCENTRATING ON THINGS, SUCH AS READING THE NEWSPAPER OR WATCHING TELEVISION: 0
5. POOR APPETITE OR OVEREATING: 0
1. LITTLE INTEREST OR PLEASURE IN DOING THINGS: 0

## 2024-02-01 NOTE — PROGRESS NOTES
Alcohol use: No    Drug use: No       Objective     Vital Signs  /84   Pulse 64   Wt 57.2 kg (126 lb 3.2 oz)   SpO2 98%   BMI 20.04 kg/m²     Wt Readings from Last 3 Encounters:   02/01/24 57.2 kg (126 lb 3.2 oz)   09/20/23 59.5 kg (131 lb 3.2 oz)   06/20/23 59.9 kg (132 lb)     Waist Circumference  There were no vitals filed for this visit.    Physical Exam  Constitutional:       Appearance: Normal appearance. He is well-developed and well-groomed.   HENT:      Head: Normocephalic.   Eyes:      Conjunctiva/sclera: Conjunctivae normal.   Neck:      Thyroid: No thyromegaly.      Vascular: No carotid bruit.   Cardiovascular:      Rate and Rhythm: Normal rate and regular rhythm.      Heart sounds: Normal heart sounds.   Pulmonary:      Effort: Pulmonary effort is normal.      Breath sounds: Normal breath sounds. No wheezing.   Musculoskeletal:      Cervical back: Neck supple.      Right lower leg: No edema.      Left lower leg: No edema.   Lymphadenopathy:      Cervical: No cervical adenopathy.   Skin:     Capillary Refill: Capillary refill takes less than 2 seconds.   Neurological:      Mental Status: He is alert and oriented to person, place, and time.      Gait: Gait normal.   Psychiatric:         Behavior: Behavior is cooperative.         Assessment   Plan   1. Encounter for well adult exam without abnormal findings  -     Lipid, Fasting; Future  -     CBC with Auto Differential; Future  -     Basic Metabolic Panel; Future  2. Mixed anxiety and depressive disorder  Assessment & Plan:   Well-controlled, continue current medications  Orders:  -     CBC with Auto Differential; Future  -     Basic Metabolic Panel; Future  3. Screening for hyperlipidemia  -     Lipid, Fasting; Future         Personalized Preventive Plan   Current Health Maintenance Status  Immunization History   Administered Date(s) Administered    DTaP 02/09/2000, 03/23/2000, 06/13/2000, 08/31/2001, 08/03/2005    Hep B, ENGERIX-B,

## 2024-02-01 NOTE — PATIENT INSTRUCTIONS
you.  Understands your Baptism and moral values.  Will do what you want, not what that person wants.  Will be able to make difficult choices at a stressful time.  Will be able to refuse or stop treatment, if that is what you would want, even if you could die.  Will be firm and confident with health professionals if needed.  Will ask questions to get needed information.  Lives near you or agrees to travel to you if needed.  Your family may help you make medical decisions while you can still be part of that process. But it's important to choose one person to be your health care agent in case you aren't able to make decisions for yourself.  If you don't fill out the legal form and name a health care agent, the decisions your family can make may be limited.  A health care agent may be called something else in your state.  Who will make decisions for you if you don't have a health care agent?  If you don't have a health care agent or a living will, you may not get the care you want. Decisions may be made by family members who disagree about your medical care. Or decisions may be made by a medical professional who doesn't know you well. In some cases, a  makes the decisions.  When you name a health care agent, it is very clear who has the power to make health decisions for you.  How do you name a health care agent?  You name your health care agent on a legal form. This form is usually called a medical power of . Ask your hospital, state bar association, or office on aging where to find these forms.  You must sign the form to make it legal. Some states require you to get the form notarized. This means that a person called a  watches you sign the form and then the notary signs the form. Some states also require that two or more witnesses sign the form.  Be sure to tell your family members and doctors who your health care agent is.  Where can you learn more?  Go to

## 2024-06-24 DIAGNOSIS — F41.8 MIXED ANXIETY AND DEPRESSIVE DISORDER: ICD-10-CM

## 2024-06-24 RX ORDER — FLUOXETINE HYDROCHLORIDE 20 MG/1
20 CAPSULE ORAL DAILY
Qty: 30 CAPSULE | Refills: 5 | Status: SHIPPED | OUTPATIENT
Start: 2024-06-24

## 2025-02-02 NOTE — PROGRESS NOTES
[address], [address]  [place], [place] [address]  Dept: [phone]  Dept Fax: [phone]    Date of Service:  2/3/2025    Ruben Ricci is a 24 y.o. male who presents today for his medical conditions/complaints as noted below.      Chief Complaint   Patient presents with   • Annual Exam     ADHD, Anxiety      DIAGNOSIS / PLAN:   Encounter for wellness examination in adult  -     Lipid, Fasting; Future  -     Basic Metabolic Panel; Future  -     CBC with Auto Differential; Future  -     Hepatitis C Antibody; Future  Recurrent major depressive disorder, in partial remission (HCC)  Assessment & Plan:   Chronic, at goal (stable), continue current treatment plan  Orders:  -     Basic Metabolic Panel; Future  -     CBC with Auto Differential; Future  Pectus excavatum  Assessment & Plan:   Stable  Screening for viral disease  -     Hepatitis C Antibody; Future  Screening for hyperlipidemia  -     Lipid, Fasting; Future     Assessment & Plan  1. Health maintenance.  BP normal. Slight weight increase but BMI remains optimal at 23. Advised eye exam every 1-2 years if vision issues arise. Discussed chickenpox vaccine (2 doses), HPV vaccine, tetanus vaccine every 10 years, and influenza vaccine for this year. COVID-19 vaccine available at pharmacy or health department. Advised HIV screen if at risk. Declined vaccinations. Ordered comprehensive lab tests including cholesterol, kidney function, and electrolytes. Consent for hepatitis C screen. Instructed to fast for 12 hours before labs, water or black coffee allowed, continue medications.    2. Depression.  Controlled with daily fluoxetine. Advised to continue current regimen.    3. Anxiety.  Controlled with daily fluoxetine. Advised to continue current regimen.    4. Attention deficit disorder.  Previously on Focalin, currently stable. Informed about Wellbutrin as an alternative. Advised to contact if difficulties arise.

## 2025-02-03 ENCOUNTER — OFFICE VISIT (OUTPATIENT)
Dept: FAMILY MEDICINE CLINIC | Age: 25
End: 2025-02-03
Payer: COMMERCIAL

## 2025-02-03 VITALS
BODY MASS INDEX: 23.25 KG/M2 | HEART RATE: 72 BPM | SYSTOLIC BLOOD PRESSURE: 116 MMHG | WEIGHT: 146.4 LBS | DIASTOLIC BLOOD PRESSURE: 74 MMHG | OXYGEN SATURATION: 98 %

## 2025-02-03 DIAGNOSIS — Z11.59 SCREENING FOR VIRAL DISEASE: ICD-10-CM

## 2025-02-03 DIAGNOSIS — F33.41 RECURRENT MAJOR DEPRESSIVE DISORDER, IN PARTIAL REMISSION (HCC): ICD-10-CM

## 2025-02-03 DIAGNOSIS — Z00.00 ENCOUNTER FOR WELLNESS EXAMINATION IN ADULT: Primary | ICD-10-CM

## 2025-02-03 DIAGNOSIS — Q67.6 PECTUS EXCAVATUM: ICD-10-CM

## 2025-02-03 DIAGNOSIS — Z13.220 SCREENING FOR HYPERLIPIDEMIA: ICD-10-CM

## 2025-02-03 PROBLEM — F41.8 MIXED ANXIETY AND DEPRESSIVE DISORDER: Status: RESOLVED | Noted: 2019-10-17 | Resolved: 2025-02-03

## 2025-02-03 PROCEDURE — 99395 PREV VISIT EST AGE 18-39: CPT | Performed by: NURSE PRACTITIONER

## 2025-02-03 SDOH — ECONOMIC STABILITY: FOOD INSECURITY: WITHIN THE PAST 12 MONTHS, YOU WORRIED THAT YOUR FOOD WOULD RUN OUT BEFORE YOU GOT MONEY TO BUY MORE.: NEVER TRUE

## 2025-02-03 SDOH — ECONOMIC STABILITY: FOOD INSECURITY: WITHIN THE PAST 12 MONTHS, THE FOOD YOU BOUGHT JUST DIDN'T LAST AND YOU DIDN'T HAVE MONEY TO GET MORE.: NEVER TRUE

## 2025-02-03 ASSESSMENT — PATIENT HEALTH QUESTIONNAIRE - PHQ9
2. FEELING DOWN, DEPRESSED OR HOPELESS: NOT AT ALL
10. IF YOU CHECKED OFF ANY PROBLEMS, HOW DIFFICULT HAVE THESE PROBLEMS MADE IT FOR YOU TO DO YOUR WORK, TAKE CARE OF THINGS AT HOME, OR GET ALONG WITH OTHER PEOPLE: NOT DIFFICULT AT ALL
8. MOVING OR SPEAKING SO SLOWLY THAT OTHER PEOPLE COULD HAVE NOTICED. OR THE OPPOSITE, BEING SO FIGETY OR RESTLESS THAT YOU HAVE BEEN MOVING AROUND A LOT MORE THAN USUAL: NOT AT ALL
3. TROUBLE FALLING OR STAYING ASLEEP: NOT AT ALL
SUM OF ALL RESPONSES TO PHQ QUESTIONS 1-9: 0
1. LITTLE INTEREST OR PLEASURE IN DOING THINGS: NOT AT ALL
4. FEELING TIRED OR HAVING LITTLE ENERGY: NOT AT ALL
7. TROUBLE CONCENTRATING ON THINGS, SUCH AS READING THE NEWSPAPER OR WATCHING TELEVISION: NOT AT ALL
9. THOUGHTS THAT YOU WOULD BE BETTER OFF DEAD, OR OF HURTING YOURSELF: NOT AT ALL
5. POOR APPETITE OR OVEREATING: NOT AT ALL
SUM OF ALL RESPONSES TO PHQ QUESTIONS 1-9: 0
6. FEELING BAD ABOUT YOURSELF - OR THAT YOU ARE A FAILURE OR HAVE LET YOURSELF OR YOUR FAMILY DOWN: NOT AT ALL
SUM OF ALL RESPONSES TO PHQ9 QUESTIONS 1 & 2: 0
SUM OF ALL RESPONSES TO PHQ QUESTIONS 1-9: 0
SUM OF ALL RESPONSES TO PHQ QUESTIONS 1-9: 0